# Patient Record
Sex: FEMALE | Race: WHITE | Employment: FULL TIME | URBAN - METROPOLITAN AREA
[De-identification: names, ages, dates, MRNs, and addresses within clinical notes are randomized per-mention and may not be internally consistent; named-entity substitution may affect disease eponyms.]

---

## 2018-11-06 ENCOUNTER — APPOINTMENT (OUTPATIENT)
Dept: CT IMAGING | Age: 23
End: 2018-11-06
Attending: EMERGENCY MEDICINE
Payer: COMMERCIAL

## 2018-11-06 ENCOUNTER — APPOINTMENT (OUTPATIENT)
Dept: CT IMAGING | Age: 23
End: 2018-11-06
Attending: PHYSICIAN ASSISTANT
Payer: COMMERCIAL

## 2018-11-06 ENCOUNTER — HOSPITAL ENCOUNTER (EMERGENCY)
Age: 23
Discharge: HOME | End: 2018-11-06
Attending: EMERGENCY MEDICINE
Payer: COMMERCIAL

## 2018-11-06 VITALS
TEMPERATURE: 98.6 F | WEIGHT: 92.15 LBS | SYSTOLIC BLOOD PRESSURE: 103 MMHG | OXYGEN SATURATION: 96 % | HEART RATE: 52 BPM | BODY MASS INDEX: 16.33 KG/M2 | RESPIRATION RATE: 16 BRPM | HEIGHT: 63 IN | DIASTOLIC BLOOD PRESSURE: 60 MMHG

## 2018-11-06 DIAGNOSIS — H57.02 ANISOCORIA: Primary | ICD-10-CM

## 2018-11-06 LAB
ALBUMIN SERPL-MCNC: 4.2 G/DL (ref 3.5–5)
ALBUMIN/GLOB SERPL: 1 {RATIO} (ref 1.1–2.2)
ALP SERPL-CCNC: 60 U/L (ref 45–117)
ALT SERPL-CCNC: 17 U/L (ref 12–78)
ANION GAP SERPL CALC-SCNC: 6 MMOL/L (ref 5–15)
APTT PPP: 26.9 SEC (ref 22.1–32)
AST SERPL-CCNC: 14 U/L (ref 15–37)
BASOPHILS # BLD: 0 K/UL (ref 0–0.1)
BASOPHILS NFR BLD: 0 % (ref 0–1)
BILIRUB SERPL-MCNC: 0.6 MG/DL (ref 0.2–1)
BUN SERPL-MCNC: 8 MG/DL (ref 6–20)
BUN/CREAT SERPL: 11 (ref 12–20)
CALCIUM SERPL-MCNC: 9.4 MG/DL (ref 8.5–10.1)
CHLORIDE SERPL-SCNC: 104 MMOL/L (ref 97–108)
CO2 SERPL-SCNC: 29 MMOL/L (ref 21–32)
CREAT SERPL-MCNC: 0.71 MG/DL (ref 0.55–1.02)
DIFFERENTIAL METHOD BLD: NORMAL
EOSINOPHIL # BLD: 0.2 K/UL (ref 0–0.4)
EOSINOPHIL NFR BLD: 3 % (ref 0–7)
ERYTHROCYTE [DISTWIDTH] IN BLOOD BY AUTOMATED COUNT: 13.5 % (ref 11.5–14.5)
GLOBULIN SER CALC-MCNC: 4.1 G/DL (ref 2–4)
GLUCOSE SERPL-MCNC: 84 MG/DL (ref 65–100)
HCG UR QL: NEGATIVE
HCT VFR BLD AUTO: 35.5 % (ref 35–47)
HGB BLD-MCNC: 11.7 G/DL (ref 11.5–16)
IMM GRANULOCYTES # BLD: 0 K/UL (ref 0–0.04)
IMM GRANULOCYTES NFR BLD AUTO: 0 % (ref 0–0.5)
INR PPP: 1.1 (ref 0.9–1.1)
LYMPHOCYTES # BLD: 2.6 K/UL (ref 0.8–3.5)
LYMPHOCYTES NFR BLD: 35 % (ref 12–49)
MCH RBC QN AUTO: 28.7 PG (ref 26–34)
MCHC RBC AUTO-ENTMCNC: 33 G/DL (ref 30–36.5)
MCV RBC AUTO: 87 FL (ref 80–99)
MONOCYTES # BLD: 0.6 K/UL (ref 0–1)
MONOCYTES NFR BLD: 9 % (ref 5–13)
NEUTS SEG # BLD: 4 K/UL (ref 1.8–8)
NEUTS SEG NFR BLD: 54 % (ref 32–75)
NRBC # BLD: 0 K/UL (ref 0–0.01)
NRBC BLD-RTO: 0 PER 100 WBC
PLATELET # BLD AUTO: 229 K/UL (ref 150–400)
PMV BLD AUTO: 10.9 FL (ref 8.9–12.9)
POTASSIUM SERPL-SCNC: 3.8 MMOL/L (ref 3.5–5.1)
PROT SERPL-MCNC: 8.3 G/DL (ref 6.4–8.2)
PROTHROMBIN TIME: 10.9 SEC (ref 9–11.1)
RBC # BLD AUTO: 4.08 M/UL (ref 3.8–5.2)
SODIUM SERPL-SCNC: 139 MMOL/L (ref 136–145)
THERAPEUTIC RANGE,PTTT: NORMAL SECS (ref 58–77)
WBC # BLD AUTO: 7.4 K/UL (ref 3.6–11)

## 2018-11-06 PROCEDURE — 74011636320 HC RX REV CODE- 636/320: Performed by: EMERGENCY MEDICINE

## 2018-11-06 PROCEDURE — 80053 COMPREHEN METABOLIC PANEL: CPT

## 2018-11-06 PROCEDURE — 70450 CT HEAD/BRAIN W/O DYE: CPT

## 2018-11-06 PROCEDURE — 85610 PROTHROMBIN TIME: CPT

## 2018-11-06 PROCEDURE — 81025 URINE PREGNANCY TEST: CPT

## 2018-11-06 PROCEDURE — 99285 EMERGENCY DEPT VISIT HI MDM: CPT

## 2018-11-06 PROCEDURE — 70496 CT ANGIOGRAPHY HEAD: CPT

## 2018-11-06 PROCEDURE — 85730 THROMBOPLASTIN TIME PARTIAL: CPT

## 2018-11-06 PROCEDURE — 36415 COLL VENOUS BLD VENIPUNCTURE: CPT

## 2018-11-06 PROCEDURE — 74011250636 HC RX REV CODE- 250/636: Performed by: EMERGENCY MEDICINE

## 2018-11-06 PROCEDURE — 74011000250 HC RX REV CODE- 250: Performed by: EMERGENCY MEDICINE

## 2018-11-06 PROCEDURE — 85025 COMPLETE CBC W/AUTO DIFF WBC: CPT

## 2018-11-06 RX ORDER — SODIUM CHLORIDE 9 MG/ML
50 INJECTION, SOLUTION INTRAVENOUS
Status: COMPLETED | OUTPATIENT
Start: 2018-11-06 | End: 2018-11-06

## 2018-11-06 RX ORDER — SODIUM CHLORIDE 0.9 % (FLUSH) 0.9 %
10 SYRINGE (ML) INJECTION
Status: COMPLETED | OUTPATIENT
Start: 2018-11-06 | End: 2018-11-06

## 2018-11-06 RX ORDER — TETRACAINE HYDROCHLORIDE 5 MG/ML
2 SOLUTION OPHTHALMIC
Status: COMPLETED | OUTPATIENT
Start: 2018-11-06 | End: 2018-11-06

## 2018-11-06 RX ADMIN — Medication 10 ML: at 17:37

## 2018-11-06 RX ADMIN — TETRACAINE HYDROCHLORIDE 2 DROP: 5 SOLUTION OPHTHALMIC at 19:38

## 2018-11-06 RX ADMIN — FLUORESCEIN SODIUM 1 STRIP: 1 STRIP OPHTHALMIC at 19:38

## 2018-11-06 RX ADMIN — SODIUM CHLORIDE 50 ML/HR: 900 INJECTION, SOLUTION INTRAVENOUS at 17:37

## 2018-11-06 RX ADMIN — IOPAMIDOL 100 ML: 755 INJECTION, SOLUTION INTRAVENOUS at 17:37

## 2018-11-06 NOTE — ED PROVIDER NOTES
EMERGENCY DEPARTMENT HISTORY AND PHYSICAL EXAM 
 
 
Date: 11/6/2018 Patient Name: Aries Laureano History of Presenting Illness Chief Complaint Patient presents with  Eye Pain  
  unequal pupils R>L with slight headache since waking up this morning History Provided By: Patient and Patient's Mother HPI: Aries Laureano, 21 y.o. female with PMHx significant for GERD, presents ambulatory to the ED with cc of a R pupil dilation x 3 hours. She reports associated symptoms of blurred vision, dizziness, a mild posterior headache, and intermittent sweating, but not localized to 1 side of her face. She states she was sitting in her room in the dark and had gotten out, when she had noticed the R pupil was larger than the L after experiencing blurred vision from R eye. Pt does endorse some nausea and vomiting, but states she has gastritis and believes she was experiencing a flare. She describes the blurry vision as \"filmy. \" Pt does report her balance being off. She had went to see her PCP who further referred her to the ER. She denies placing anything in her eyes. Pt denies the use of any contacts or glasses. She denies any chronic medication use noting she has Ativan PRN. She denies the use of hormone replacement therapy. Pt denies any tobacco use. She denies any recent MVC or neck trauma. Pt denies any eye pain. There are no other complaints, changes, or physical findings at this time. PCP: Edvin Gorman MD 
 
Past History Past Medical History: 
Past Medical History:  
Diagnosis Date  Gastritis  GERD (gastroesophageal reflux disease) Past Surgical History: 
History reviewed. No pertinent surgical history. Family History: No family history on file. Social History: 
Social History Tobacco Use  Smoking status: Never Smoker Substance Use Topics  Alcohol use: Yes  Drug use: No  
 
 
Allergies: Allergies Allergen Reactions  Onion Nausea and Vomiting and Swelling Review of Systems Review of Systems Constitutional: Positive for diaphoresis. Negative for chills and fever. HENT: Negative for congestion. Eyes: Positive for visual disturbance (+blurred). Negative for pain. +R pupil dilated Respiratory: Negative for chest tightness. Cardiovascular: Negative for chest pain and leg swelling. Gastrointestinal: Positive for nausea and vomiting. Negative for abdominal pain. Endocrine: Negative for polyuria. Genitourinary: Negative for dysuria and frequency. Musculoskeletal: Negative for myalgias. Skin: Negative for color change. Allergic/Immunologic: Negative for immunocompromised state. Neurological: Positive for dizziness and headaches. Negative for numbness. Physical Exam  
Physical Exam  
Nursing note and vitals reviewed. General appearance: non-toxic, no distress Eyes: EOMI, conjunctiva normal, anicteric sclera, anisocoria w/ R pupil larger than left, pupils consensual and direct response to light, pupil constricts to accomodation HEENT: mucous membranes moist, oropharynx is clear, TM normal, no carotid bruit Pulmonary: clear to auscultation bilaterally Cardiac: normal rate and regular rhythm, no murmurs, gallops, or rubs, 2+DP pulses, 2+ radial pulses Abdomen: soft, nontender, nondistended, bowel sounds present MSK: no pre-tibial edema Neuro: Alert, answers questions appropriately, slight decreased light touch R face, face symmetric, tongue midline, uvula midline, normal hearing b/l 
Skin: capillary refill brisk Diagnostic Study Results Labs - Recent Results (from the past 12 hour(s)) CBC WITH AUTOMATED DIFF Collection Time: 11/06/18  4:40 PM  
Result Value Ref Range WBC 7.4 3.6 - 11.0 K/uL  
 RBC 4.08 3.80 - 5.20 M/uL  
 HGB 11.7 11.5 - 16.0 g/dL HCT 35.5 35.0 - 47.0 % MCV 87.0 80.0 - 99.0 FL  
 MCH 28.7 26.0 - 34.0 PG  
 MCHC 33.0 30.0 - 36.5 g/dL RDW 13.5 11.5 - 14.5 % PLATELET 117 789 - 310 K/uL MPV 10.9 8.9 - 12.9 FL  
 NRBC 0.0 0  WBC ABSOLUTE NRBC 0.00 0.00 - 0.01 K/uL NEUTROPHILS 54 32 - 75 % LYMPHOCYTES 35 12 - 49 % MONOCYTES 9 5 - 13 % EOSINOPHILS 3 0 - 7 % BASOPHILS 0 0 - 1 % IMMATURE GRANULOCYTES 0 0.0 - 0.5 % ABS. NEUTROPHILS 4.0 1.8 - 8.0 K/UL  
 ABS. LYMPHOCYTES 2.6 0.8 - 3.5 K/UL  
 ABS. MONOCYTES 0.6 0.0 - 1.0 K/UL  
 ABS. EOSINOPHILS 0.2 0.0 - 0.4 K/UL  
 ABS. BASOPHILS 0.0 0.0 - 0.1 K/UL  
 ABS. IMM. GRANS. 0.0 0.00 - 0.04 K/UL  
 DF AUTOMATED METABOLIC PANEL, COMPREHENSIVE Collection Time: 11/06/18  4:40 PM  
Result Value Ref Range Sodium 139 136 - 145 mmol/L Potassium 3.8 3.5 - 5.1 mmol/L Chloride 104 97 - 108 mmol/L  
 CO2 29 21 - 32 mmol/L Anion gap 6 5 - 15 mmol/L Glucose 84 65 - 100 mg/dL BUN 8 6 - 20 MG/DL Creatinine 0.71 0.55 - 1.02 MG/DL  
 BUN/Creatinine ratio 11 (L) 12 - 20 GFR est AA >60 >60 ml/min/1.73m2 GFR est non-AA >60 >60 ml/min/1.73m2 Calcium 9.4 8.5 - 10.1 MG/DL Bilirubin, total 0.6 0.2 - 1.0 MG/DL  
 ALT (SGPT) 17 12 - 78 U/L  
 AST (SGOT) 14 (L) 15 - 37 U/L Alk. phosphatase 60 45 - 117 U/L Protein, total 8.3 (H) 6.4 - 8.2 g/dL Albumin 4.2 3.5 - 5.0 g/dL Globulin 4.1 (H) 2.0 - 4.0 g/dL A-G Ratio 1.0 (L) 1.1 - 2.2 PROTHROMBIN TIME + INR Collection Time: 11/06/18  4:40 PM  
Result Value Ref Range INR 1.1 0.9 - 1.1 Prothrombin time 10.9 9.0 - 11.1 sec PTT Collection Time: 11/06/18  4:40 PM  
Result Value Ref Range aPTT 26.9 22.1 - 32.0 sec  
 aPTT, therapeutic range     58.0 - 77.0 SECS  
HCG URINE, QL. - POC Collection Time: 11/06/18  6:06 PM  
Result Value Ref Range Pregnancy test,urine (POC) NEGATIVE  NEG Radiologic Studies -  
CT Results  (Last 48 hours) 11/06/18 1739  CT HEAD WO CONT Final result Impression:  IMPRESSION: No acute changes . Narrative:  EXAM:  CT HEAD WO CONT INDICATION:   Headache; unequal pupils COMPARISON: None. CONTRAST:  None. TECHNIQUE: Unenhanced CT of the head was performed using 5 mm images. Brain and  
bone windows were generated. CT dose reduction was achieved through use of a  
standardized protocol tailored for this examination and automatic exposure  
control for dose modulation. FINDINGS:  
No extra-axial fluid collection hemorrhage shift or masses. 11/06/18 1739  CTA HEAD NECK W CONT Final result Impression:   impression: Negative examination. Narrative:  EXAM:  CTA HEAD NECK W CONT INDICATION:   Uneven pupils COMPARISON:  None. CONTRAST:  100 mL of Isovue-370. TECHNIQUE:  Unenhanced  images were obtained to localize the volume for  
acquisition. Multislice helical axial CT angiography was performed from the  
aortic arch to the top of the head during uneventful rapid bolus intravenous  
contrast administration. Coronal and sagittal reformations and 3D post  
processing was performed. CT dose reduction was achieved through use of a  
standardized protocol tailored for this examination and automatic exposure  
control for dose modulation. FINDINGS:  
   
Origins of the great vessels appear unremarkable. Vertebral arteries are of  
equal size in the neck and patent. Basilar artery appears unremarkable. Carotid  
bifurcation show no stenosis according to the nascet criteria. Intracranially  
there is no evidence for stenosis or vascular malformation. Medical Decision Making I am the first provider for this patient. I reviewed the vital signs, available nursing notes, past medical history, past surgical history, family history and social history. Vital Signs-Reviewed the patient's vital signs. Patient Vitals for the past 12 hrs: 
 Temp Pulse Resp BP SpO2  
11/06/18 2000  (!) 52 16 103/56 97 % 11/06/18 1945  (!) 52 16 101/58 97 % 11/06/18 1915  (!) 56 14 106/52 96 % 11/06/18 1900  (!) 50 14 101/58 90 % 11/06/18 1830  (!) 53 15 110/66 99 % 11/06/18 1800  (!) 54 11 106/65 99 % 11/06/18 1700  (!) 50 19 111/53 95 % 11/06/18 1643  (!) 59 16 106/64 99 % 11/06/18 1604 98.6 °F (37 °C) (!) 58 16 113/51 100 % Pulse Oximetry Analysis - 95% on RA Cardiac Monitor:  
Rate: 54 bpm 
Rhythm: Sinus Bradycardia Records Reviewed: Nursing Notes, Old Medical Records, Previous Radiology Studies and Previous Laboratory Studies Provider Notes (Medical Decision Making): DDx: drug induced anisocoria, lower suspicion for CVA or ocular injury ED Course:  
Initial assessment performed. The patients presenting problems have been discussed, and they are in agreement with the care plan formulated and outlined with them. I have encouraged them to ask questions as they arise throughout their visit. CONSULT NOTE: 
4:52 PM 
[employer]. Davie Matute MD spoke with Dr. John Chan, Specialty: Neurology Discussed patient's hx, disposition, and available diagnostic and imaging results. Reviewed care plans. Consultant agrees with plans as outlined. Consultant recommends a head CTA along with a head CT and she will see the pt via tele-neuro. Procedure Note - Wood's lamp exam: 
7:50 PM 
Performed by: Kevin Henry. Davie Matute MD 
Pts R eye was anesthetized with tetracaine, stained with fluorescein, and examined with a Wood's lamp, using lid eversion. Foreign body: no 
Fluorescein uptake: no 
 
Procedure Note - Steven-pen Exam: 
7:55 PM  
Performed by Ana Fontenot MD: 
Pt's intraocular pressure in her R eye was checked using a steven-pen. Prior to procedure steven-pen was calibrated and reset for accurate measurement. Pressure was 15, 10, 15 mmHg in her R eye. The procedure took 1-15 minutes, and pt tolerated well. Visual acuity: L eye is 20/25 R eye is 20/25 BL eyes 20/20 The procedure took 1-15 minutes, and pt tolerated well. PROGRESS NOTE: 
8:00 PM 
Pt has been re-evaluated. Mother states she has been using scopolamine with the pt around her, although keeps her medication . CONSULT NOTE: 
8:23 PM 
Cherylene Shaper. Laureen Sickles, MD spoke with Dr. Troy Mcmahan, Specialty: Ophthamology Discussed patient's hx, disposition, and available diagnostic and imaging results. Reviewed care plans. Consultant agrees with plans as outlined. Consultant agrees likely pharmacologic as mother is using scopolamine patch. Discharge with f/u. Critical Care Time:  
0 Disposition: 
DISCHARGE NOTE 
8:32 PM 
The patient has been re-evaluated and is ready for discharge. Reviewed available results with patient. Counseled patient on diagnosis and care plan. Patient has expressed understanding, and all questions have been answered. Patient agrees with plan and agrees to follow up as recommended, or return to the ED if their symptoms worsen. Discharge instructions have been provided and explained to the patient, along with reasons to return to the ED. PLAN: 
1. Discharge There are no discharge medications for this patient. 2.  
Follow-up Information Follow up With Specialties Details Why Contact Info Suha Amaya MD Family Practice Schedule an appointment as soon as possible for a visit in 2 days  [address] 
[place] 
[phone] [facility] EMERGENCY DEPT Emergency Medicine Go in 1 day If symptoms worsen [address] [place] [address] [address] [facility] 
[phone] Marya Llanes MD Ophthalmology Schedule an appointment as soon as possible for a visit in 1 week As needed [facility] [address] [address] 
[phone] Return to ED if worse Diagnosis Clinical Impression: 1. Anisocoria Attestations: This note is prepared by Philip Bishop, acting as Scribe for Cherylene Shaper. Laureen Sickles, MD. 
 
 John Henley. Zuri German MD: The scribe's documentation has been prepared under my direction and personally reviewed by me in its entirety. I confirm that the note above accurately reflects all work, treatment, procedures, and medical decision making performed by me. This note will not be viewable in [address]

## 2018-11-06 NOTE — ED NOTES
Patient presents to ED with C/O the right pupil being greater than the left, \"a slight H/A and my right eye being foggy\", and N/V that started today about 1:30 pm. The pt stated he has gastritis and GERD which causes her to have symptoms of N/V frequently. The pt denies hitting her head and neck pain. Patient is A&Ox4, call bell w/in reach, and aware of plan of care. The patient is in family at the bedside.

## 2018-11-07 NOTE — DISCHARGE INSTRUCTIONS
Learning About Anisocoria  What is anisocoria? Anisocoria (say \"an-eye-soh-KOR-ee-uh) is a difference in the size of your pupils. The pupil is the black area in the center of your iris. The iris is the colored part of your eye. The iris controls the pupil to let the right amount of light into the eye. In bright light, the pupil narrows (constricts) to let in less light. In dim light, the pupil widens (dilates) to let in more light. When you have anisocoria, one of your pupils does not constrict or dilate as well as the other one. So the pupils look uneven. A slight difference in the size of the pupils is normal for some people. But in other cases this condition is the result of a medical problem. Examples include an injury to the eye, an infection, or nerve damage. What are the symptoms? Having different-sized pupils usually doesn't cause any symptoms. And by itself, it is rarely a cause for concern. Anisocoria is more likely to be the sign of a serious problem if it occurs along with other symptoms, such as:  · A droopy upper eyelid. · Eye pain. · A severe headache. · Vision problems, such as double vision. · Loss of vision. Tell your doctor right away if you have any of these symptoms. What can you expect when you have it? An eye doctor (ophthalmologist) will do a complete eye exam. The doctor will:  · See how your pupils respond to bright and dim light. · Look at the inside of your eyes. · Check how well your eyes focus. The doctor might ask to see old photos to find out how long your pupils have been uneven. You may have other tests to help rule out a serious cause of uneven pupils. For example, the doctor might check how your eyes respond to eye drops. Or you might have an imaging test, such as an MRI. If a medical problem is causing the difference in pupil size, your treatment will depend on the cause.  You will not need any treatment if your uneven pupils are not linked to a medical problem. Follow-up care is a key part of your treatment and safety. Be sure to make and go to all appointments, and call your doctor if you are having problems. It's also a good idea to know your test results and keep a list of the medicines you take. Where can you learn more? Go to http://avtar-evin.info/. Enter 432 [id number] in the search box to learn more about \"Learning About Anisocoria. \"  Current as of: December 3, 2017  Content Version: 11.8  © 8354-0680 [employer]. Care instructions adapted under license by [employer] (which disclaims liability or warranty for this information). If you have questions about a medical condition or this instruction, always ask your healthcare professional. [address] any warranty or liability for your use of this information.

## 2018-11-07 NOTE — ED NOTES
Patient resting comfortably, side rail up, call bell w/in reach, no further needs expressed at this time, aware of POC. Mother at the bedside.

## 2018-11-07 NOTE — ED NOTES
Patient discharged and given discharge instructions by Cecille Martin MD. Patient had an opportunity to ask questions. Patient verbalized understanding of discharge instructions. Patient d/c from ED ambulatory, discharge instructions and prescriptions in hand. Patient accompanied by mother. Pt refused a wheelchair.